# Patient Record
Sex: FEMALE | Race: WHITE
[De-identification: names, ages, dates, MRNs, and addresses within clinical notes are randomized per-mention and may not be internally consistent; named-entity substitution may affect disease eponyms.]

---

## 2017-01-22 ENCOUNTER — HOSPITAL ENCOUNTER (OUTPATIENT)
Dept: HOSPITAL 80 - FIMAGING | Age: 77
End: 2017-01-22
Payer: COMMERCIAL

## 2017-01-22 DIAGNOSIS — M25.462: ICD-10-CM

## 2017-01-22 DIAGNOSIS — M24.10: ICD-10-CM

## 2017-01-22 DIAGNOSIS — M23.242: ICD-10-CM

## 2017-01-22 DIAGNOSIS — M25.562: Primary | ICD-10-CM

## 2017-01-23 NOTE — MR
MRI of the Left Knee



Clinical Indications:  Left knee pain.



Technique:  Fat-suppressed, fast T2-weighted images were acquired axially, sagittally, and coronally.
  T1-weighted sagittal images were obtained.



Findings:  



Medial compartment: There is diffuse articular cartilage loss throughout the medial compartment, grad
e 2 severity. Associated free edge fraying and maceration of the medial meniscus of the posterior med
ial corner, without linear tear.



Lateral compartment: More severe grade 3 chondral loss involves the weightbearing and posterior aspec
ts of the lateral tibial plateau, with subchondral bone marrow edema. There is mild chondral thinning
 diffusely throughout the lateral femoral condyle. Degenerative maceration and tear anterior horn lat
eral meniscus, with undersurface irregularity and fraying. Disease extends into the anterior aspect o
f the body. The posterior horn is intact with minimal free edge fraying.



Patellofemoral compartment: Diffuse grade 2 chondral thinning medial patellar facet, without focal de
fect.



Anterior and posterior cruciate ligaments are intact. Medial and lateral collateral ligaments are int
act. Distal quadriceps and patellar tendons are intact.



There is a moderate knee joint effusion with a moderate size retropopliteal cyst, with evidence of ru
pture and fluid tracking inferiorly along the medial head of the gastrocnemius.



IMPRESSION:

1. Articular cartilage disease in all 3 compartments, most severe within the lateral compartment. Ass
ociated degenerative tear anterior horn lateral meniscus.

2. Knee joint effusion with ruptured retropopliteal cyst.

## 2017-06-28 ENCOUNTER — HOSPITAL ENCOUNTER (OUTPATIENT)
Dept: HOSPITAL 80 - FIMAGING | Age: 77
End: 2017-06-28
Payer: COMMERCIAL

## 2017-06-28 DIAGNOSIS — M25.552: Primary | ICD-10-CM

## 2017-06-30 ENCOUNTER — HOSPITAL ENCOUNTER (INPATIENT)
Dept: HOSPITAL 80 - F3N | Age: 77
LOS: 91 days | Discharge: HOME HEALTH | DRG: 470 | End: 2017-09-29
Attending: ORTHOPAEDIC SURGERY | Admitting: ORTHOPAEDIC SURGERY
Payer: COMMERCIAL

## 2017-06-30 DIAGNOSIS — R33.9: ICD-10-CM

## 2017-06-30 DIAGNOSIS — M62.838: ICD-10-CM

## 2017-06-30 DIAGNOSIS — M17.12: Primary | ICD-10-CM

## 2017-06-30 PROCEDURE — C1713 ANCHOR/SCREW BN/BN,TIS/BN: HCPCS

## 2017-06-30 PROCEDURE — G0008 ADMIN INFLUENZA VIRUS VAC: HCPCS

## 2017-07-13 ENCOUNTER — HOSPITAL ENCOUNTER (OUTPATIENT)
Dept: HOSPITAL 80 - FIMAGING | Age: 77
End: 2017-07-13
Payer: COMMERCIAL

## 2017-07-13 DIAGNOSIS — Z78.0: ICD-10-CM

## 2017-07-13 DIAGNOSIS — Z13.820: Primary | ICD-10-CM

## 2017-07-13 DIAGNOSIS — M85.80: ICD-10-CM

## 2017-07-13 DIAGNOSIS — Z82.62: ICD-10-CM

## 2017-08-16 ENCOUNTER — HOSPITAL ENCOUNTER (OUTPATIENT)
Dept: HOSPITAL 80 - BMCIMAGING | Age: 77
End: 2017-08-16
Payer: COMMERCIAL

## 2017-08-16 DIAGNOSIS — Z12.31: Primary | ICD-10-CM

## 2017-08-16 PROCEDURE — G0202 SCR MAMMO BI INCL CAD: HCPCS

## 2017-08-22 ENCOUNTER — HOSPITAL ENCOUNTER (OUTPATIENT)
Dept: HOSPITAL 80 - CIMAGING | Age: 77
End: 2017-08-22
Payer: COMMERCIAL

## 2017-08-22 DIAGNOSIS — M43.16: ICD-10-CM

## 2017-08-22 DIAGNOSIS — M48.06: Primary | ICD-10-CM

## 2017-08-22 DIAGNOSIS — M51.36: ICD-10-CM

## 2017-09-01 LAB
% IMMATURE GRANULYOCYTES: 0.2 % (ref 0–1.1)
ABSOLUTE IMMATURE GRANULOCYTES: 0.01 10^3/UL (ref 0–0.1)
ABSOLUTE NRBC COUNT: 0 10^3/UL (ref 0–0.01)
ADD DIFF?: NO
ADD MORPH?: NO
ADD SCAN?: NO
ATYPICAL LYMPHOCYTE FLAG: 10 (ref 0–99)
ERYTHROCYTE [DISTWIDTH] IN BLOOD BY AUTOMATED COUNT: 12.5 % (ref 11.5–15.2)
FRAGMENT RBC FLAG: 0 (ref 0–99)
HCT VFR BLD CALC: 38.7 % (ref 38–47)
HGB BLD-MCNC: 13 G/DL (ref 12.6–16.3)
LEFT SHIFT FLG: 0 (ref 0–99)
LIPEMIA HEMOLYSIS FLAG: 80 (ref 0–99)
Lab: (no result)
MCH RBC BLDCO QN: 31.4 PG (ref 27.9–34.1)
MCHC RBC AUTO-ENTMCNC: 33.6 G/DL (ref 32.4–36.7)
MCV RBC AUTO: 93.5 FL (ref 81.5–99.8)
NRBC-AUTO%: 0 % (ref 0–0.2)
PLATELET # BLD: 226 10^3/UL (ref 150–400)
PLATELET CLUMPS FLAG: 0 (ref 0–99)
PMV BLD AUTO: 9.1 FL (ref 8.7–11.7)
RBC # BLD AUTO: 4.14 10^6/UL (ref 4.18–5.33)

## 2017-09-03 LAB — Lab: (no result)

## 2017-09-13 ENCOUNTER — HOSPITAL ENCOUNTER (OUTPATIENT)
Dept: HOSPITAL 80 - F3N | Age: 77
Discharge: HOME | End: 2017-09-13
Attending: ORTHOPAEDIC SURGERY
Payer: COMMERCIAL

## 2017-09-13 VITALS
HEART RATE: 68 BPM | SYSTOLIC BLOOD PRESSURE: 155 MMHG | RESPIRATION RATE: 16 BRPM | DIASTOLIC BLOOD PRESSURE: 82 MMHG | TEMPERATURE: 97.9 F | OXYGEN SATURATION: 96 %

## 2017-09-13 DIAGNOSIS — Z53.9: ICD-10-CM

## 2017-09-13 DIAGNOSIS — M17.12: Primary | ICD-10-CM

## 2017-09-13 NOTE — PDHPUP
History & Physical Update


H&P update statement: 


This history and physical update is based on an assessment of the patient which 

was completed after admission or registration (within 24 hours), but prior to 

the surgery/procedure.





H&P changes: Pt informed doc of a cut she sustained while cutting her toenails. 

laceration is open on 4th toe of operative leg.  we will cancel surgery and 

reschedule when healed.

## 2017-09-27 PROCEDURE — 0SRD0J9 REPLACEMENT OF LEFT KNEE JOINT WITH SYNTHETIC SUBSTITUTE, CEMENTED, OPEN APPROACH: ICD-10-PCS | Performed by: ORTHOPAEDIC SURGERY

## 2017-09-27 RX ADMIN — ACETAMINOPHEN SCH MG: 325 TABLET ORAL at 20:23

## 2017-09-27 RX ADMIN — DIVALPROEX SODIUM SCH MG: 250 TABLET, EXTENDED RELEASE ORAL at 20:24

## 2017-09-27 RX ADMIN — ASPIRIN SCH MG: 325 TABLET, FILM COATED ORAL at 20:24

## 2017-09-27 RX ADMIN — ACETAMINOPHEN SCH MG: 325 TABLET ORAL at 23:02

## 2017-09-27 RX ADMIN — DOCUSATE SODIUM AND SENNOSIDES SCH TAB: 50; 8.6 TABLET ORAL at 20:23

## 2017-09-27 RX ADMIN — Medication SCH MLS: at 22:15

## 2017-09-27 RX ADMIN — OXYCODONE HYDROCHLORIDE PRN MG: 15 TABLET ORAL at 20:21

## 2017-09-27 RX ADMIN — OXYCODONE HYDROCHLORIDE PRN MG: 15 TABLET ORAL at 23:03

## 2017-09-27 RX ADMIN — FAMOTIDINE SCH MG: 20 TABLET, FILM COATED ORAL at 20:25

## 2017-09-27 NOTE — PDANEPAE
ANE History of Present Illness





L TKA





ANE Past Medical History





- Cardiovascular History


Hx Hypertension: No


Hx Arrhythmias: No


Hx Chest Pain: No


Hx Coronary Artery / Peripheral Vascular Disease: No


Hx CHF / Valvular Disease: No


Hx Palpitations: No





- Pulmonary History


Hx COPD: No


Hx Asthma/Reactive Airway Disease: No


Hx Recent Upper Respiratory Infection: No


Hx Sleep Apnea: No


Sleep Apnea Screening Result - Last Documented: Negative


Pulmonary History Comment: CHILDHOOD ASTHMA





- Neurologic History


Hx Cerebrovascular Accident: No


Hx Seizures: No


Hx Dementia: No





- Endocrine History


Hx Diabetes: No


Hypothyroid: Yes


Endocrine History Comment: HYPOTHYROID





- Renal History


Hx Renal Disorders: No


Renal History Comment: URGENCY





- Liver History


Hx Hepatic Disorders: No





- Neurological & Psychiatric Hx


Hx Neurological and Psychiatric Disorders: Yes


Neurological / Psychiatric History Comment: ADDj, depression.  ANXIETY,MOODS





- Cancer History


Hx Cancer: No


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





- Congenital Disorder History


Hx Congenital Disorders: No





- GI History


GERD: no


Hx Gastrointestinal Disorders: Yes


Gastrointestinal History Comment: COLECTOMY.  COLITIS





- Other Health History


Other Health History: DDD





- Chronic Pain History


Chronic Pain: Yes (LOW BACK AND LT KNEE)





- Surgical History


Prior Surgeries: COLECTOMY





ANE Review of Systems


Review of Systems: 








- Exercise capacity


METS (RN): 5 METS





ANE Patient History





- Allergies


Allergies/Adverse Reactions: 








morphine Allergy (Verified 08/17/17 11:34)


 Itching








- Home Medications


Home Medications: 








Acetaminophen [Tylenol 325mg (*)] 650 mg PO Q4-6PRN PRN 08/17/17 [Last Taken 09/ 09/17]


Divalproex ER [Depakote  MG (*)] 250 mg PO HS 08/17/17 [Last Taken 09/12/ 17 23:00]


Levothyroxine [Synthroid 125 mcg (*)] 62.5 mcg PO DAILY06 08/17/17 [Last Taken 

09/13/17 06:30]


Polyethylene Glycol 3350 [Miralax 17 gm (*)] 17 gm PO DAILY 08/17/17 [Last 

Taken 09/11/17]


buPROPion XL [Wellbutrin Xl] 300 mg PO DAILY 08/17/17 [Last Taken 09/13/17 06:30

]


traMADol [Ultram 50 mg (*)] 50 mg PO QID PRN 08/17/17 [Last Taken 09/10/17]


traZODone [traZODONE 50MG (*)] 100 - 150 mg PO HS PRN 08/17/17 [Last Taken 2 

Weeks Ago ~08/30/17]








- NPO status


NPO Since - Liquids (Date): 09/27/17


NPO Since - Liquids (Time): 10:00


NPO Since - Solids (Date): 09/26/17


NPO Since - Solids (Time): 21:00





- Smoking Hx


Smoking Status: Never smoked





- Family Anes Hx


Family Hx Anesthesia Complications: NEG





ANE Labs/Vital Signs





- Vital Signs


Blood Pressure: 120/75


Heart Rate: 72


Respiratory Rate: 18


O2 Sat (%): 95


Height: 171.45 cm


Weight: 61.235 kg





ANE Physical Exam





- Airway


Neck exam: FROM


Mallampati Score: Class 1


Mouth exam: normal dental/mouth exam





- Pulmonary


Pulmonary: clear to auscultation





- Cardiovascular


Cardiovascular: regular rate and rhythym





- ASA Status


ASA Status: III





ANE Anesthesia Plan


Anesthesia Plan: spinal


Regional Anesthesia: adductor canal FNB

## 2017-09-27 NOTE — POSTOPPROG
Post Op Note


Date of Operation: 09/27/17


Surgeon: JOLENE Torres


Assistant: Hafsa Torres


Anesthesiologist: Pool


Anesthesia: Spinal


Pre-op Diagnosis: L knee djd


Post-op Diagnosis: same


Indication: pain


Procedure: L TKA


Findings: DJD knee


Inf/Abcess present in the surg proc area at time of surgery?: No


EBL:

## 2017-09-28 VITALS — RESPIRATION RATE: 16 BRPM

## 2017-09-28 LAB
HCT VFR BLD CALC: 29.4 % (ref 38–47)
HGB BLD-MCNC: 10.1 G/DL (ref 12.6–16.3)

## 2017-09-28 RX ADMIN — OXYCODONE HYDROCHLORIDE PRN MG: 15 TABLET ORAL at 01:56

## 2017-09-28 RX ADMIN — FAMOTIDINE SCH MG: 20 TABLET, FILM COATED ORAL at 21:21

## 2017-09-28 RX ADMIN — ACETAMINOPHEN SCH MG: 325 TABLET ORAL at 23:53

## 2017-09-28 RX ADMIN — CYCLOBENZAPRINE HYDROCHLORIDE PRN MG: 10 TABLET, FILM COATED ORAL at 18:14

## 2017-09-28 RX ADMIN — DOCUSATE SODIUM AND SENNOSIDES SCH TAB: 50; 8.6 TABLET ORAL at 21:21

## 2017-09-28 RX ADMIN — OXYCODONE HYDROCHLORIDE PRN MG: 15 TABLET ORAL at 04:51

## 2017-09-28 RX ADMIN — ACETAMINOPHEN SCH MG: 325 TABLET ORAL at 18:11

## 2017-09-28 RX ADMIN — OXYCODONE HYDROCHLORIDE PRN MG: 15 TABLET ORAL at 18:09

## 2017-09-28 RX ADMIN — OXYCODONE HYDROCHLORIDE PRN MG: 15 TABLET ORAL at 23:54

## 2017-09-28 RX ADMIN — FAMOTIDINE SCH MG: 20 TABLET, FILM COATED ORAL at 13:56

## 2017-09-28 RX ADMIN — ASPIRIN SCH MG: 325 TABLET, FILM COATED ORAL at 11:51

## 2017-09-28 RX ADMIN — OXYCODONE HYDROCHLORIDE PRN MG: 15 TABLET ORAL at 07:46

## 2017-09-28 RX ADMIN — OXYCODONE HYDROCHLORIDE PRN MG: 15 TABLET ORAL at 15:03

## 2017-09-28 RX ADMIN — DOCUSATE SODIUM AND SENNOSIDES SCH TAB: 50; 8.6 TABLET ORAL at 13:56

## 2017-09-28 RX ADMIN — ACETAMINOPHEN SCH MG: 325 TABLET ORAL at 11:48

## 2017-09-28 RX ADMIN — ACETAMINOPHEN SCH MG: 325 TABLET ORAL at 04:50

## 2017-09-28 RX ADMIN — OXYCODONE HYDROCHLORIDE PRN MG: 15 TABLET ORAL at 15:01

## 2017-09-28 RX ADMIN — OXYCODONE HYDROCHLORIDE PRN MG: 15 TABLET ORAL at 21:07

## 2017-09-28 RX ADMIN — CYCLOBENZAPRINE HYDROCHLORIDE PRN MG: 10 TABLET, FILM COATED ORAL at 06:05

## 2017-09-28 RX ADMIN — OXYCODONE HYDROCHLORIDE PRN MG: 15 TABLET ORAL at 11:49

## 2017-09-28 RX ADMIN — POLYETHYLENE GLYCOL 3350 SCH GM: 17 POWDER, FOR SOLUTION ORAL at 13:56

## 2017-09-28 RX ADMIN — Medication SCH MLS: at 04:50

## 2017-09-28 RX ADMIN — DIVALPROEX SODIUM SCH MG: 250 TABLET, EXTENDED RELEASE ORAL at 21:20

## 2017-09-28 RX ADMIN — LEVOTHYROXINE SODIUM SCH MCG: 125 TABLET ORAL at 04:52

## 2017-09-28 NOTE — GOP
[f 
rep st]



                                                                OPERATIVE REPORT





DATE OF OPERATION:  09/27/2017



SURGEON:  BASSAM Torres MD



ASSISTANT:  Hafsa Torres PA-C



ANESTHESIA:  Spinal.



PREOPERATIVE DIAGNOSIS:  Left knee osteoarthritis.



POSTOPERATIVE DIAGNOSIS:  Left knee osteoarthritis.



PROCEDURE PERFORMED:  Left total knee arthroplasty.



FINDINGS/PATHOLOGY:  Severe lateral and patellofemoral osteoarthritis.  



ESTIMATED BLOOD LOSS:  30 cc.



INDICATIONS:  This is a 76-year-old female with severe and progressive pain and 
deformity of the left knee unresponsive to conservative care.  Risks and 
benefits of the surgical intervention were explained in detail.



DESCRIPTION OF PROCEDURE:  The patient was brought to the operative room and 
placed on the table in the supine position.  Spinal anesthesia was induced 
without difficulty.  A pneumatic tourniquet was applied about the left proximal 
thigh, and the leg was prepped and draped in a sterile fashion.  The leg espinoza 
was applied.  After exsanguination by elevation the tourniquet was inflated to 
250 mm of mercury. 



Incision was made anterior medial from the tibial tuberosity to a point 2 cm 
proximal to the superior pole of the patella.  Medial parapatellar arthrotomy 
was carried out from the superior pole of the patella and posteriorly in line 
with the fibers of the Type II VMO.  The medial collateral ligament was 
elevated and the infrapatellar fat pad was resected. 



The patella was everted and the articular surface was excised.  A 35 mm 
patellar button was placed. The distal femoral guide hole was drilled and the 6-
degree alignment ole was placed.  A 10 mm distal femoral cut was made without 
difficulty. 



Attention was turned to the tibia and a standard 9 mm cut based on the lateral 
tibial condyle was performed.  The tibial articular surface was excised without 
difficulty. 



Attention was turned back to the femur and a size 5 Triathlon femoral cutting 
block was positioned.  Anterior, posterior, and chamfer cuts were made, 
followed by the intercondylar box cut. 



The knee was extended and the remnants of the medial and lateral meniscus were 
excised.  The posterior capsule was injected with ropivacaine, epinephrine and 
Toradol.  A size 5 MIS mini keel tibial tray was positioned.  Trial reduction 
was then carried out.  There was excellent range of motion, alignment, and 
stability using the 9 mm polyethylene. 



All trials were then removed.  The joint was thoroughly irrigated and carefully 
dried.  Two packages of cement and 2 grams of vancomycin were mixed in the 
vacuum mixer and placed on the fixation surfaces of all surfaces of the 
components.  The components were implanted and all excess cement was thoroughly 
removed.  The permanent 11 mm polyethylene X3 was placed without difficulty.  



The tourniquet was deflated and all bleeders were coagulated.  The wound was 
thoroughly irrigated and closed using interrupted sutures of 2-0 Vicryl for the 
joint capsule.  The subcu was closed with 3-0 Vicryl and the skin with 4-0 
Monocryl.  Dermabond and Steri-Strips were applied followed by a compressive 
dressing.  The patient was then moved from the operating room to the recovery 
room in good condition, having tolerated the procedure well.





Job #:  [id number]

MTDD

## 2017-09-28 NOTE — ASMTCMCOM
CM Note

 

CM Note                       

Notes:

OT clears pt for home, PT rec HHC. Pt and partner Bill agreeable to HHC, referral sent to [facility] for review. CM to follow. 

 

Date Signed:  09/28/2017 04:19 PM

Electronically Signed By:PAOLA Boyd

## 2017-09-28 NOTE — SOAPPROG
SOAP Progress Note


Assessment/Plan: 


Assessment:








Monae is doing well today POD 1 s/p L TKA





1) pain management: pain is well controlled on oral pain meds


2) VTE ppx: recommend aspirin daily for 3 weeks and continue SCDs and YAIR hose


3) Anemia: level expected initially postop, asymptomatic, continue to monitor 

for symptoms


4) D/c planning: d/c to home tomorrow pending release from PT. Patient may 

benefit from home health assistance.  Appreciate case management's assistance 

in coordinating.


5)postop urinary retention: straight cath'd yesterday, resolved today.

















Plan:





09/28/17 09:45





09/28/17 09:46





Subjective: 





Monae is doing well today, denies SOB, chest pain and N/v.


Objective: 





 Vital Signs











Temp Pulse Resp BP Pulse Ox


 


 36.1 C   64   14   116/67   96 


 


 09/28/17 07:50  09/28/17 07:50  09/28/17 07:50  09/28/17 07:50  09/28/17 07:50








 Laboratory Results





 09/28/17 04:46 





 











 09/27/17 09/28/17 09/29/17





 05:59 05:59 05:59


 


Intake Total  3099 


 


Output Total  780 


 


Balance  2319 








LLE: incision dressing is clean and dry, NVI, +pf/df





ICD10 Worksheet


Patient Problems: 


 Problems











Problem Status Onset


 


Primary localized osteoarthritis of left knee Acute

## 2017-09-28 NOTE — GDS
[f rep st]



                                                             DISCHARGE SUMMARY





ADMISSION DIAGNOSIS:  Left knee osteoarthritis.



DISCHARGE DIAGNOSIS:  Left knee osteoarthritis.



PROCEDURE:  Left total knee arthroplasty.



VTE PROPHYLAXIS:  Aspirin recommended 3 weeks daily.



BRIEF DESCRIPTION OF HOSPITAL STAY:  Patient was admitted for an elective joint arthroplasty.  The pa
tient tolerated the procedure well and has passed physical therapy.  The patient was given appropriat
e antibiotic prophylaxis and venous thromboembolism prophylaxis.  The patient's pain was well control
led on oral pain medication, patient was holding down food, and had urinated.  Decision was made to d
ischarge the patient.  The patient was given post-operative prescriptions pre-operatively.



PLAN:  Please follow up as scheduled at Dr. Torres's office in 3 weeks.





Job #:  [id number]

## 2017-09-29 VITALS
SYSTOLIC BLOOD PRESSURE: 147 MMHG | TEMPERATURE: 97.6 F | HEART RATE: 74 BPM | DIASTOLIC BLOOD PRESSURE: 63 MMHG | OXYGEN SATURATION: 100 %

## 2017-09-29 LAB
HCT VFR BLD CALC: 31.4 % (ref 38–47)
HGB BLD-MCNC: 10.5 G/DL (ref 12.6–16.3)

## 2017-09-29 RX ADMIN — CYCLOBENZAPRINE HYDROCHLORIDE PRN MG: 10 TABLET, FILM COATED ORAL at 02:07

## 2017-09-29 RX ADMIN — FAMOTIDINE SCH MG: 20 TABLET, FILM COATED ORAL at 09:38

## 2017-09-29 RX ADMIN — ACETAMINOPHEN SCH MG: 325 TABLET ORAL at 11:20

## 2017-09-29 RX ADMIN — OXYCODONE HYDROCHLORIDE PRN MG: 15 TABLET ORAL at 13:21

## 2017-09-29 RX ADMIN — ASPIRIN SCH MG: 325 TABLET, FILM COATED ORAL at 09:37

## 2017-09-29 RX ADMIN — OXYCODONE HYDROCHLORIDE PRN MG: 15 TABLET ORAL at 02:59

## 2017-09-29 RX ADMIN — ACETAMINOPHEN SCH MG: 325 TABLET ORAL at 05:26

## 2017-09-29 RX ADMIN — DOCUSATE SODIUM AND SENNOSIDES SCH TAB: 50; 8.6 TABLET ORAL at 09:40

## 2017-09-29 RX ADMIN — OXYCODONE HYDROCHLORIDE PRN MG: 15 TABLET ORAL at 09:37

## 2017-09-29 RX ADMIN — OXYCODONE HYDROCHLORIDE PRN MG: 15 TABLET ORAL at 05:26

## 2017-09-29 RX ADMIN — POLYETHYLENE GLYCOL 3350 SCH GM: 17 POWDER, FOR SOLUTION ORAL at 09:40

## 2017-09-29 RX ADMIN — LEVOTHYROXINE SODIUM SCH MCG: 125 TABLET ORAL at 05:27

## 2017-09-29 NOTE — ASDISCHSUM
----------------------------------------------

Discharge Information

----------------------------------------------

Plan Status:Home with Home Health                    Medically Cleared to Leave:

Discharge Date:09/29/2017 03:50 PM                   CM D/C Disposition:Home Health Service

[facility] D/C Disposition:[facility]                        Projected Discharge Date:09/29/2017 11:00 AM

Transportation at D/C:                               Discharge Delay Reason:

Follow-Up Date:09/29/2017 11:00 AM                   Discharge Slot:

Final Diagnosis:

----------------------------------------------

Placement Information

----------------------------------------------

Referral Type:*Home Health Care Services             Referral ID:[id number]

Provider Name:[facility] [place]

Address 1:[address]                    Phone Number:

Address 2:                                           Fax Number:

City:[place]                                      Selection Factors:

State:[place]

 

----------------------------------------------

Patient Contact Information

----------------------------------------------

Contact Name:ILIA                             Relationship:Daughter

Address:                                             Home [phone]

                                                     Work Phone:

City:                                                [facility] Phone:

[facility] Code:                                      Email:

----------------------------------------------

Financial Information

----------------------------------------------

Financial Class:Medicare Advantage Plans

Primary Plan Desc:[employer] PLAN          Primary Plan Number:[id number]

Secondary Plan Desc:                                 Secondary Plan Number:

 

 

----------------------------------------------

Assessment Information

----------------------------------------------

----------------------------------------------

[facility] CM Progress Note

----------------------------------------------

CM Note

 

CM Note                       

Notes:

OT clears pt for home, PT rec HHC. Pt and partner Bill agreeable to HHC, referral sent to [facility] for review. CM to follow. 

 

Date Signed:  09/28/2017 04:19 PM

Electronically Signed By:PAOLA Boyd

 

 

----------------------------------------------

[facility] CM Progress Note

----------------------------------------------

CM Note

 

CM Note                       

Notes:

Pt medically stable for d/c w [facility] and family support. Orders sent. 

 

Date Signed:  09/29/2017 04:15 PM

Electronically Signed By:PAOLA Boyd

 

 

----------------------------------------------

Intervention Information

----------------------------------------------

## 2017-09-29 NOTE — PDIAF
- Diagnosis


Diagnosis: s/p TKA


Code Status: Full Code





- Medication Management


Discharge Medications: 


 Medications to Continue on Transfer





RX: Acetaminophen [Tylenol 325mg (*)] 650 mg PO Q4-6PRN PRN 08/17/17 [Last 

Taken 09/09/17]


RX: Divalproex ER [Depakote  MG (*)] 250 mg PO HS 08/17/17 [Last Taken 09/ 12/17 23:00]


RX: Levothyroxine [Synthroid 125 mcg (*)] 62.5 mcg PO DAILY06 08/17/17 [Last 

Taken 09/13/17 06:30]


RX: Polyethylene Glycol 3350 [Miralax 17 gm (*)] 17 gm PO DAILY 08/17/17 [Last 

Taken 09/11/17]


RX: buPROPion XL [Wellbutrin 150mg XL] 300 mg PO DAILY 08/17/17 [Last Taken 09/ 13/17 06:30]


RX: traMADol [Ultram 50 mg (*)] 50 mg PO QID PRN 08/17/17 [Last Taken 09/10/17]


RX: traZODone [traZODONE 50MG (*)] 100 - 150 mg PO HS PRN 08/17/17 [Last Taken 

2 Weeks Ago ~08/30/17]


RX: Acetaminophen [Tylenol 325mg (*)] 650 mg PO Q6HRS  tab 09/28/17 [Last Taken 

Unknown]


RX: Aspirin [Aspirin 325 mg (*)] 325 mg PO DAILY  tab 09/28/17 [Last Taken 

Unknown]


RX: Sennosides/Docusate Sodium [Senokot-S] 1 - 2 tab PO BID  tab 09/28/17 [Last 

Taken Unknown]


RX: celeCOXIB [Celebrex (*)] 200 mg PO DAILY  cap 09/28/17 [Last Taken Unknown]


RX: oxyCODONE IR [Oxycodone Ir (*)] 5 - 10 mg PO Q3HRS PRN  tab 09/28/17 [Last 

Taken Unknown]








Discharge Medications: Refer to the Discharge Home Medication list for PRN 

reason.





- Orders


Services needed: Home Care, Registered Nurse, Certified Nursing Aide, Physical 

Therapy


Home Care Face to Face: I certify that this patient was under my care and that 

I had the required face-to-face encounter meeting the encounter requirements on 

the discharge day.  My findings support the fact that the patient is homebound 

as defined in


Home Care Face to Face Continued: CMS Chapter 7 Medicare Benefits Manual 30.1.1

, The condition of the patient is such that there exists a normal inability to 

leave home and consequently, leaving home would require a considerable and 

taxing effort.


Diet Recommendation: no restrictions on diet


Diet Texture: Regular Texture Diet


[employer] Stockings Discontinue Date: daytime x 2 weeks


Wound Care Instructions: remove incision dressing 14days from surgery.  cover 

for showers.





- Follow Up Care


Current Providers and Referrals: 


Mike Palomo PA [Primary Care Provider] -

## 2017-09-29 NOTE — PDFACE2FAC
Face to Face Encounter


1.  I certify that this patient is under my care and that I, or a nurse 

practitioner or physician's assistant working with me, had a face-to-face 

encounter that meets the physician face-to-face encounter requirements with 

this patient on 09/29/17.








2. I certify that based on my findings, the following services are medically 

necessary home health services:





        [X Nursing] 


        [X Physical Therapy] 


        [ Speech-Language Pathology]








3. The medical condition and clinical findings that support the need for 

specialized 


   skills, knowledge and judgement of the above services are:





        [s/p TKA, increase in pain, inable to drive herself, must use FWW]





4. I certify this patient is homebound* because [the patient's condition 

restricts their ability to leave their home except with the assistance of 

another individual or the aid of a supportive device.]


    


    





___postop TKA, must use FWW and on new narcotics for postop pain________________

_______________________________________________





I certify that this patient is confined to his/her home and needs intermittent 

skilled nursing care, physical and/or speech therapy. This patient is under my 

care and I have authorized home health services.











* Homebound is defined by Medicare as follows: absences from home require 


   considerable and tacking effort and or for medical reasons or [employer] 

services or are 


   infrequent or of short duration when for other reasons*.

## 2017-09-29 NOTE — SOAPPROG
SOAP Progress Note


Assessment/Plan: 


Assessment:








Monae is doing well today POD 2 s/p L TKA





1) pain management: pain is well controlled on oral pain meds


2) VTE ppx: recommend aspirin daily for 3 weeks and continue SCDs and YAIR hose


3) Anemia: level expected initially postop, asymptomatic, continue to monitor 

for symptoms


4) D/c planning: d/c to home tomorrow pending release from PT. Patient may 

benefit from home health assistance.  Appreciate case management's assistance 

in coordinating.


5)postop urinary retention: straight cath'd yesterday, resolve.


6) muscle spasms: flexeril alleviates it.  [place] [facility] [place] 

[facility]

















Plan:





09/28/17 09:45





09/28/17 09:46





09/29/17 13:58





Subjective: 





Monae is doing well.  flexeril alleviates spasms


Objective: 





 Vital Signs











Temp Pulse Resp BP Pulse Ox


 


 36.4 C   74   16   147/63 H  100 


 


 09/29/17 07:53  09/29/17 07:53  09/29/17 07:53  09/29/17 07:53  09/29/17 07:53








 Laboratory Results





 09/29/17 04:27 





 











 09/28/17 09/29/17 09/30/17





 05:59 05:59 05:59


 


Intake Total 3099 350 


 


Output Total 780 900 


 


Balance 2319 -550 








LLE; incision dressing is clean and dry





ICD10 Worksheet


Patient Problems: 


 Problems











Problem Status Onset


 


Primary localized osteoarthritis of left knee Acute

## 2017-09-29 NOTE — ASMTCMCOM
CM Note

 

CM Note                       

Notes:

Pt medically stable for d/c w [facility] and family support. Orders sent. 

 

Date Signed:  09/29/2017 04:15 PM

Electronically Signed By:PAOLA Boyd

## 2017-10-03 ENCOUNTER — HOSPITAL ENCOUNTER (EMERGENCY)
Dept: HOSPITAL 80 - FED | Age: 77
Discharge: HOME | End: 2017-10-03
Payer: COMMERCIAL

## 2017-10-03 VITALS
RESPIRATION RATE: 16 BRPM | HEART RATE: 98 BPM | SYSTOLIC BLOOD PRESSURE: 154 MMHG | DIASTOLIC BLOOD PRESSURE: 82 MMHG | OXYGEN SATURATION: 92 %

## 2017-10-03 VITALS — TEMPERATURE: 99.7 F

## 2017-10-03 DIAGNOSIS — T84.89XA: Primary | ICD-10-CM

## 2017-10-03 DIAGNOSIS — Z79.82: ICD-10-CM
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
## 2017-10-03 NOTE — EDPHY
H & P


Stated Complaint: left knee pain and tenderness


Time Seen by Provider: 10/03/17 21:06





- Personal History


Current Tetanus/Diphtheria Vaccine: Yes


Current Tetanus Diphtheria and Acellular Pertussis (TDAP): Yes





- Medical/Surgical History


Hx Asthma: No


Hx Chronic Respiratory Disease: No


Hx Diabetes: No


Hx Cardiac Disease: No


Hx Renal Disease: No


Hx Cirrhosis: No


Hx Alcoholism: No


Hx HIV/AIDS: No


Hx Splenectomy or Spleen Trauma: No


Other PMH: Recurrent diverticulitis, spondylolisthesis of spine, anxiety, 

depression, colon resection, left knee replacement





- Social History


Smoking Status: Never smoked


Constitutional: 


 Initial Vital Signs











Temperature (C)  37.6 C   10/03/17 19:42


 


Heart Rate  100   10/03/17 19:42


 


Respiratory Rate  18   10/03/17 19:42


 


Blood Pressure  134/83 H  10/03/17 19:42


 


O2 Sat (%)  96   10/03/17 19:42








 











O2 Delivery Mode               Room Air














Allergies/Adverse Reactions: 


 





morphine Allergy (Verified 08/17/17 11:34)


 Itching








Home Medications: 














 Medication  Instructions  Recorded


 


Acetaminophen [Tylenol 325mg (*)] 650 mg PO Q4-6PRN PRN 08/17/17


 


Divalproex ER [Depakote   mg PO HS 08/17/17





(*)]  


 


Levothyroxine [Synthroid 125 mcg 62.5 mcg PO DAILY06 08/17/17





(*)]  


 


Polyethylene Glycol 3350 [Miralax 17 gm PO DAILY 08/17/17





17 gm (*)]  


 


buPROPion XL [Wellbutrin 150mg XL] 300 mg PO DAILY 08/17/17


 


traMADol [Ultram 50 mg (*)] 50 mg PO QID PRN 08/17/17


 


traZODone [traZODONE 50MG (*)] 100 - 150 mg PO HS PRN 08/17/17


 


Acetaminophen [Tylenol 325mg (*)] 650 mg PO Q6HRS  tab 09/28/17


 


Aspirin [Aspirin 325 mg (*)] 325 mg PO DAILY  tab 09/28/17


 


Sennosides/Docusate Sodium 1 - 2 tab PO BID  tab 09/28/17





[Senokot-S]  


 


celeCOXIB [Celebrex (*)] 200 mg PO DAILY  cap 09/28/17


 


oxyCODONE IR [Oxycodone Ir (*)] 5 - 10 mg PO Q3HRS PRN  tab 09/28/17


 


fentaNYL 12.5 10/03/17














Medical Decision Making





- Diagnostics


Imaging Results: 


 Imaging Impressions





Extremity Venous Study  10/03/17 21:17


Impression: Negative. No deep venous thrombosis.


 


Findings discussed with Emergency Department physician, Ajit Hodges MD  at  

10/3/2017 22:23.


 


 











Imaging: Discussed imaging studies w/ On call Radiologist


ED Course/Re-evaluation: 





CHIEF COMPLAINT:  Leg pain and swelling





HISTORY OF PRESENT ILLNESS:  The patient is a 75 y/o female complaining of left 

knee pain 6 days after a total knee replacement. Today she's noticed increasing 

swelling, pain, and heat in her entire leg compared to previous days. She notes 

she did not wear her compression stockings last night. She denies fever, chest 

pain, shortness of breath, or other acute symptoms. She is taking multiple 

agents to control her chronic pain and acute knee pain including Tramadol, 

Oxycodone, and Tylenol. She is also taking 324mg Aspirin daily for clot 

prophylaxis since the surgery.





REVIEW OF SYSTEMS:  





A 10 point review of systems was performed and is negative with the exception 

of the elements mentioned in the history of present illness.





PHYSICAL EXAM:  





HR, BP, O2 Sat, RR.  Temp noted


General Appearance:  Alert, well hydrated, appropriate, and non-toxic appearing.


Head:  Atraumatic without scalp tenderness or obvious injury


Eyes:  Pupils equal, round, reactive to light and accommodation, EOMI, no trauma

, no injection.


Nose:  Atraumatic, no rhinorrhea, clear.


Throat:  Mucus membranes moist.


Neck:  Supple


Respiratory:  No retractions, no distress, no wheezes, and no accessory muscle 

use.  Lungs are clear to auscultation bilaterally.


Cardiovascular:  Regular rate and rhythm, no murmurs, rubs, or gallops. Left 

dorsalis pedis and posterior tibial pulses intact. Good capillary refill all 

extremities.


Gastrointestinal:  Abdomen is soft, nontender, non-distended, no masses, no 

rebound, no guarding, no peritoneal signs.


Musculoskeletal:  Warmth and ecchymosis to medial left leg, edema and bandage 

over left knee, ecchymosis to entire left lower leg. Otherwise normal active 

ROM of all extremities, atraumatic.


Neurological:  Alert, appropriate, and interactive.  Nonfocal neuro exam. 


Skin:  No rashes, good turgor, no nodules on palpation.





Past medical history: Chronic back pain


Past surgical history: Total left knee replacement by Dr. Torres 9/25/17.


Family history: noncontributory


Social history: Daughter at bedside. PCP: MYCHAL Palomo





DIAGNOSTICS/PROCEDURES/CRITICAL CARE TIME:  





Left leg US: no DVT





DIFFERENTIAL DIAGNOSIS:   The differential diagnosis for the patient's leg 

swelling included but was not limited to hypoalbuminemia, congestive heart 

failure, cor pulmonale, venous stasis, trauma, and DVT.





MEDICAL DECISION MAKING:  This is a 75 y/o female who is 6 days out from a 

total left knee replacement and presents today with worsening pain, swelling, 

and warmth of her entire left leg. She denies other acute symptoms. She has 

noticeable ecchymosis and swelling to her left leg, but no signs of cellulitis. 

Plan for US to rule out DVT. 





2230: Reassessed patient and discussed imaging results. No DVT on US. No signs 

of infection on exam. She will be discharged with standard postoperative care 

instructions and return precautions. She is comfortable with this plan. 





Departure





- Departure


Disposition: Home, Routine, Self-Care


Clinical Impression: 


 Postoperative edema





Condition: Good


Instructions:  Precautions after Total Joint Replacement Surgery (ED)


Additional Instructions: 


Continue post-surgical care as directed by your orthopedist. Follow up with 

your orthopedist this week. Return to the ED for severe pain, chest pain, 

shortness of breath, fever, or other worsening of condition.


Referrals: 


Mike Palomo PA [Primary Care Provider] - As per Instructions


JOLENE Torres MD [Medical Doctor] - As per Instructions


Report Scribed for: Ajit Hodges


Report Scribed by: Rachele Rollins


Date of Report: 10/03/17


Time of Report: 21:19

## 2018-11-07 ENCOUNTER — HOSPITAL ENCOUNTER (OUTPATIENT)
Dept: HOSPITAL 80 - BMCIMAGING | Age: 78
End: 2018-11-07
Payer: COMMERCIAL

## 2018-11-07 DIAGNOSIS — Z12.31: Primary | ICD-10-CM

## 2019-03-22 ENCOUNTER — HOSPITAL ENCOUNTER (OUTPATIENT)
Dept: HOSPITAL 80 - FIMAGING | Age: 79
End: 2019-03-22
Payer: COMMERCIAL

## 2019-03-22 DIAGNOSIS — R47.1: Primary | ICD-10-CM

## 2019-06-04 ENCOUNTER — HOSPITAL ENCOUNTER (OUTPATIENT)
Dept: HOSPITAL 80 - FIMAGING | Age: 79
End: 2019-06-04
Payer: COMMERCIAL